# Patient Record
Sex: FEMALE | Race: WHITE | NOT HISPANIC OR LATINO | URBAN - METROPOLITAN AREA
[De-identification: names, ages, dates, MRNs, and addresses within clinical notes are randomized per-mention and may not be internally consistent; named-entity substitution may affect disease eponyms.]

---

## 2022-06-27 ENCOUNTER — INPATIENT (INPATIENT)
Facility: HOSPITAL | Age: 72
LOS: 1 days | Discharge: HOME | DRG: 190 | End: 2022-06-29
Attending: INTERNAL MEDICINE | Admitting: INTERNAL MEDICINE
Payer: MEDICARE

## 2022-06-27 VITALS
SYSTOLIC BLOOD PRESSURE: 120 MMHG | TEMPERATURE: 99 F | RESPIRATION RATE: 20 BRPM | HEART RATE: 103 BPM | OXYGEN SATURATION: 88 % | DIASTOLIC BLOOD PRESSURE: 66 MMHG | HEIGHT: 63 IN | WEIGHT: 235.01 LBS

## 2022-06-27 LAB
ALBUMIN SERPL ELPH-MCNC: 4.3 G/DL — SIGNIFICANT CHANGE UP (ref 3.3–5)
ALP SERPL-CCNC: 82 U/L — SIGNIFICANT CHANGE UP (ref 40–120)
ALT FLD-CCNC: 16 U/L — SIGNIFICANT CHANGE UP (ref 10–45)
ANION GAP SERPL CALC-SCNC: 12 MMOL/L — SIGNIFICANT CHANGE UP (ref 5–17)
APPEARANCE UR: CLEAR — SIGNIFICANT CHANGE UP
APTT BLD: 33.2 SEC — SIGNIFICANT CHANGE UP (ref 27.5–35.5)
AST SERPL-CCNC: 19 U/L — SIGNIFICANT CHANGE UP (ref 10–40)
BACTERIA # UR AUTO: NEGATIVE — SIGNIFICANT CHANGE UP
BASOPHILS # BLD AUTO: 0.04 K/UL — SIGNIFICANT CHANGE UP (ref 0–0.2)
BASOPHILS NFR BLD AUTO: 0.5 % — SIGNIFICANT CHANGE UP (ref 0–2)
BILIRUB SERPL-MCNC: 0.5 MG/DL — SIGNIFICANT CHANGE UP (ref 0.2–1.2)
BILIRUB UR-MCNC: NEGATIVE — SIGNIFICANT CHANGE UP
BUN SERPL-MCNC: 14 MG/DL — SIGNIFICANT CHANGE UP (ref 7–23)
CALCIUM SERPL-MCNC: 9.1 MG/DL — SIGNIFICANT CHANGE UP (ref 8.4–10.5)
CHLORIDE SERPL-SCNC: 99 MMOL/L — SIGNIFICANT CHANGE UP (ref 96–108)
CO2 SERPL-SCNC: 26 MMOL/L — SIGNIFICANT CHANGE UP (ref 22–31)
COLOR SPEC: YELLOW — SIGNIFICANT CHANGE UP
COMMENT - URINE: SIGNIFICANT CHANGE UP
CREAT SERPL-MCNC: 0.81 MG/DL — SIGNIFICANT CHANGE UP (ref 0.5–1.3)
DIFF PNL FLD: NEGATIVE — SIGNIFICANT CHANGE UP
EGFR: 78 ML/MIN/1.73M2 — SIGNIFICANT CHANGE UP
EOSINOPHIL # BLD AUTO: 0 K/UL — SIGNIFICANT CHANGE UP (ref 0–0.5)
EOSINOPHIL NFR BLD AUTO: 0 % — SIGNIFICANT CHANGE UP (ref 0–6)
EPI CELLS # UR: 1 /HPF — SIGNIFICANT CHANGE UP
GAS PNL BLDV: SIGNIFICANT CHANGE UP
GLUCOSE SERPL-MCNC: 132 MG/DL — HIGH (ref 70–99)
GLUCOSE UR QL: NEGATIVE — SIGNIFICANT CHANGE UP
HCT VFR BLD CALC: 43 % — SIGNIFICANT CHANGE UP (ref 34.5–45)
HGB BLD-MCNC: 13.9 G/DL — SIGNIFICANT CHANGE UP (ref 11.5–15.5)
HYALINE CASTS # UR AUTO: 1 /LPF — SIGNIFICANT CHANGE UP (ref 0–2)
IMM GRANULOCYTES NFR BLD AUTO: 0.4 % — SIGNIFICANT CHANGE UP (ref 0–1.5)
INR BLD: 1.12 RATIO — SIGNIFICANT CHANGE UP (ref 0.88–1.16)
KETONES UR-MCNC: ABNORMAL
LEUKOCYTE ESTERASE UR-ACNC: NEGATIVE — SIGNIFICANT CHANGE UP
LYMPHOCYTES # BLD AUTO: 0.44 K/UL — LOW (ref 1–3.3)
LYMPHOCYTES # BLD AUTO: 5.5 % — LOW (ref 13–44)
MCHC RBC-ENTMCNC: 28.5 PG — SIGNIFICANT CHANGE UP (ref 27–34)
MCHC RBC-ENTMCNC: 32.3 GM/DL — SIGNIFICANT CHANGE UP (ref 32–36)
MCV RBC AUTO: 88.3 FL — SIGNIFICANT CHANGE UP (ref 80–100)
MONOCYTES # BLD AUTO: 0.21 K/UL — SIGNIFICANT CHANGE UP (ref 0–0.9)
MONOCYTES NFR BLD AUTO: 2.6 % — SIGNIFICANT CHANGE UP (ref 2–14)
NEUTROPHILS # BLD AUTO: 7.3 K/UL — SIGNIFICANT CHANGE UP (ref 1.8–7.4)
NEUTROPHILS NFR BLD AUTO: 91 % — HIGH (ref 43–77)
NITRITE UR-MCNC: NEGATIVE — SIGNIFICANT CHANGE UP
NRBC # BLD: 0 /100 WBCS — SIGNIFICANT CHANGE UP (ref 0–0)
PH UR: 6 — SIGNIFICANT CHANGE UP (ref 5–8)
PLATELET # BLD AUTO: 235 K/UL — SIGNIFICANT CHANGE UP (ref 150–400)
POTASSIUM SERPL-MCNC: 4.3 MMOL/L — SIGNIFICANT CHANGE UP (ref 3.5–5.3)
POTASSIUM SERPL-SCNC: 4.3 MMOL/L — SIGNIFICANT CHANGE UP (ref 3.5–5.3)
PROT SERPL-MCNC: 7.1 G/DL — SIGNIFICANT CHANGE UP (ref 6–8.3)
PROT UR-MCNC: SIGNIFICANT CHANGE UP
PROTHROM AB SERPL-ACNC: 13 SEC — SIGNIFICANT CHANGE UP (ref 10.5–13.4)
RAPID RVP RESULT: SIGNIFICANT CHANGE UP
RBC # BLD: 4.87 M/UL — SIGNIFICANT CHANGE UP (ref 3.8–5.2)
RBC # FLD: 13.3 % — SIGNIFICANT CHANGE UP (ref 10.3–14.5)
RBC CASTS # UR COMP ASSIST: 3 /HPF — SIGNIFICANT CHANGE UP (ref 0–4)
SARS-COV-2 RNA SPEC QL NAA+PROBE: SIGNIFICANT CHANGE UP
SODIUM SERPL-SCNC: 137 MMOL/L — SIGNIFICANT CHANGE UP (ref 135–145)
SP GR SPEC: 1.02 — SIGNIFICANT CHANGE UP (ref 1.01–1.02)
UROBILINOGEN FLD QL: NEGATIVE — SIGNIFICANT CHANGE UP
WBC # BLD: 8.02 K/UL — SIGNIFICANT CHANGE UP (ref 3.8–10.5)
WBC # FLD AUTO: 8.02 K/UL — SIGNIFICANT CHANGE UP (ref 3.8–10.5)
WBC UR QL: 3 /HPF — SIGNIFICANT CHANGE UP (ref 0–5)

## 2022-06-27 PROCEDURE — 99218: CPT | Mod: FS,CS,25

## 2022-06-27 PROCEDURE — 93010 ELECTROCARDIOGRAM REPORT: CPT

## 2022-06-27 RX ORDER — LOSARTAN POTASSIUM 100 MG/1
100 TABLET, FILM COATED ORAL DAILY
Refills: 0 | Status: DISCONTINUED | OUTPATIENT
Start: 2022-06-27 | End: 2022-06-29

## 2022-06-27 RX ORDER — IPRATROPIUM/ALBUTEROL SULFATE 18-103MCG
3 AEROSOL WITH ADAPTER (GRAM) INHALATION ONCE
Refills: 0 | Status: COMPLETED | OUTPATIENT
Start: 2022-06-27 | End: 2022-06-27

## 2022-06-27 RX ORDER — AZITHROMYCIN 500 MG/1
500 TABLET, FILM COATED ORAL EVERY 24 HOURS
Refills: 0 | Status: DISCONTINUED | OUTPATIENT
Start: 2022-06-27 | End: 2022-06-29

## 2022-06-27 RX ORDER — DIAZEPAM 5 MG
5 TABLET ORAL AT BEDTIME
Refills: 0 | Status: DISCONTINUED | OUTPATIENT
Start: 2022-06-27 | End: 2022-06-29

## 2022-06-27 RX ORDER — ATORVASTATIN CALCIUM 80 MG/1
40 TABLET, FILM COATED ORAL AT BEDTIME
Refills: 0 | Status: DISCONTINUED | OUTPATIENT
Start: 2022-06-27 | End: 2022-06-29

## 2022-06-27 RX ORDER — IPRATROPIUM/ALBUTEROL SULFATE 18-103MCG
3 AEROSOL WITH ADAPTER (GRAM) INHALATION EVERY 6 HOURS
Refills: 0 | Status: DISCONTINUED | OUTPATIENT
Start: 2022-06-27 | End: 2022-06-29

## 2022-06-27 RX ORDER — BUDESONIDE AND FORMOTEROL FUMARATE DIHYDRATE 160; 4.5 UG/1; UG/1
2 AEROSOL RESPIRATORY (INHALATION)
Refills: 0 | Status: DISCONTINUED | OUTPATIENT
Start: 2022-06-27 | End: 2022-06-28

## 2022-06-27 RX ADMIN — ATORVASTATIN CALCIUM 40 MILLIGRAM(S): 80 TABLET, FILM COATED ORAL at 21:20

## 2022-06-27 RX ADMIN — LOSARTAN POTASSIUM 100 MILLIGRAM(S): 100 TABLET, FILM COATED ORAL at 21:19

## 2022-06-27 RX ADMIN — Medication 3 MILLILITER(S): at 15:55

## 2022-06-27 RX ADMIN — AZITHROMYCIN 255 MILLIGRAM(S): 500 TABLET, FILM COATED ORAL at 19:21

## 2022-06-27 RX ADMIN — Medication 3 MILLILITER(S): at 21:22

## 2022-06-27 RX ADMIN — Medication 3 MILLILITER(S): at 13:44

## 2022-06-27 RX ADMIN — Medication 40 MILLIGRAM(S): at 19:21

## 2022-06-27 NOTE — ED CDU PROVIDER DISPOSITION NOTE - CARE PROVIDER_API CALL
Mehdi Betancur)  Critical Care Medicine; Internal Medicine; Pulmonary Disease  [address], [place] [address]  Phone: [phone]  Fax: [phone]  Follow Up Time:

## 2022-06-27 NOTE — ED ADULT NURSE NOTE - OBJECTIVE STATEMENT
72 y/o female with PMHx of HTN, HLD, COPD presents to the ED sent in by urgent care for hypoxia. Patient states that she has been feeling "sick" for three days. Patient complains of sore throat, dry cough and congestion. Today felt generalized weakness and head pressure. She went to urgent care and was told that she was hypoxic and had an abnormal EKG. She was sent to the ED for further evaluation. Patient states that she was given prednisone at urgent care. States that her son had "strep" few weeks ago. Denies any other sick contacts. She does not wear oxygen at home. Denies any headache, fever, chills, chest pain, abdominal pain, n/v/d.

## 2022-06-27 NOTE — ED CDU PROVIDER INITIAL DAY NOTE - ATTENDING APP SHARED VISIT CONTRIBUTION OF CARE
Attending MD Santamaria:   I personally have seen and examined this patient. I have performed a substantive portion of the visit including all aspects of the medical decision making.   Physician assistant note reviewed and agree on plan of care and except where noted.            *The above represents an initial assessment/impression. Please refer to progress notes for potential changes in patient clinical course*

## 2022-06-27 NOTE — ED CDU PROVIDER DISPOSITION NOTE - NSFOLLOWUPINSTRUCTIONS_ED_ALL_ED_FT
Follow up with your Primary Care Physician within the next 2-3 days    Follow up with Pulmonology to get your medications for COPD managed sometime this week    Mehdi Betancur)  Critical Care Medicine; Internal Medicine; Pulmonary Disease  [address], [place] [address]  Phone: [phone]  Fax: [phone]    Bring a copy of your test results with you to your appointment    Continue your current medication regimen.   Additionally, You will need to take Azithromycin as prescribed  Inhalers:  Steroids:       Chronic Obstructive Pulmonary Disease    Chronic obstructive pulmonary disease (COPD) is a lung condition in which airflow from the lungs is limited. Causes include smoking, secondhand smoke exposure, genetics, or recurrent infections. Take all medicines (inhaled or pills) as directed by your health care provider. Avoid exposure to irritants such as smoke, chemicals, and fumes that aggravate your breathing.    If you are a smoker, the most important thing that you can do is stop smoking. Continuing to smoke will cause further lung damage and breathing trouble. Ask your health care provider for help with quitting smoking.    SEEK IMMEDIATE MEDICAL CARE IF YOU HAVE ANY OF THE FOLLOWING SYMPTOMS: shortness of breath at rest or when talking, bluish discoloration of lips, skin, fever, worsening cough, unexplained chest pain, or lightheadedness/dizziness.

## 2022-06-27 NOTE — ED PROVIDER NOTE - PROGRESS NOTE DETAILS
02 sat 89% after ambulation in the ED. Patient reassessed after Duoneb. On reexamination patient with persistent wheezing to b/l lower lung fields. States that she didn't finish the 2nd duoneb because she went to the bathroom. Will order another Duoneb. Melina Phipps PA-C Patient evaluated by CDU PA. Patient states that she does not want to continue taking Duoneb treatments because the albuterol gives her palpitations. Explained to patient that she needs to continue both steroids and Duoneb treatments in order to improve her breathing. Explained to patient that with both admission and the observation unit these medications are essential for her treatment. Patient refusing admission to the hospital. Shared decision making made with patient to be placed in CDU rather than admission to the hospital. Patient in agreement of plan for IV steroids and nebulizer treatments. She prefers to have nebulizer treatments spaced out longer than normal. Call placed out to [facility] pulmonologist Dr. Mehdi Betancur. Melina Phipps PA-C Patient evaluated by CDU PA. Patient states that she does not want to continue taking Duoneb treatments because the albuterol gives her palpitations. Explained to patient that she needs to continue both steroids and Duoneb treatments in order to improve her breathing. Explained to patient that with both admission and the observation unit these medications are essential for her treatment. Patient refusing admission to the hospital. Shared decision making made with patient to be placed in CDU rather than admission to the hospital. Patient in agreement of plan for IV steroids and nebulizer treatments. She prefers to have nebulizer treatments spaced out longer than normal. Call placed to pulmonology. Patients pulmonologist Dr. Mehdi Betancur. Melina Phipps PA-C Spoke to Dr. Reagan who is covering pulmonologist for patients doctor Dr. Beatncur. Discussed case. He agrees with treatment plan. He stated that doctor from their team will evaluate patient in the AM. Melina Phipps PA-C

## 2022-06-27 NOTE — ED ADULT TRIAGE NOTE - CHIEF COMPLAINT QUOTE
worsening sob, cough, sore throat, weakness- [facility]/s- covid swab negative, while at urgent care pt found to have LLL infiltrate on chest xray with a.flutter on ekg which is a new finding, pt denies any previous cardiac hx  (pt had recent close contact with son who has strep throat) strep at urgent care neg

## 2022-06-27 NOTE — ED CDU PROVIDER DISPOSITION NOTE - CLINICAL COURSE
72 y/o female with PMHx of HTN, HLD, COPD presents to the ED sent in by urgent care for hypoxia. Patient states that she has been feeling "sick" for three days. Patient complains of sore throat, dry cough and congestion. patient with O2 90% on RA, patient endorses typically 94%. patient states that she is noncompliant with COPD meds at home. patient with poor air movement, negative xray and negative RVP. patient declining admission for new o2 requirement, agrees to Obs for q8h steroids, duonebs and azithro as well as pulm consult.  overnight_________________ 70 y/o female with PMHx of HTN, HLD, COPD presents to the ED sent in by urgent care for hypoxia. Patient states that she has been feeling "sick" for three days. Patient complains of sore throat, dry cough and congestion. patient with O2 90% on RA, patient endorses typically 94%. patient states that she is noncompliant with COPD meds at home. patient with poor air movement, negative xray and negative RVP. patient declining admission for new o2 requirement, agrees to Obs for q8h steroids, duonebs and azithro as well as pulm consult.  in cdu, pt continued to have wheezing, hypoxia. ambulatory pulse ox- low 80s. pt seen by Pulm. will admit to medicine with their following

## 2022-06-27 NOTE — ED PROVIDER NOTE - ATTENDING APP SHARED VISIT CONTRIBUTION OF CARE
Attending MD Santamaria:   I personally have seen and examined this patient. I have performed a substantive portion of the visit including all aspects of the medical decision making.   Physician assistant note reviewed and agree on plan of care and except where noted.          71F with COPD not on home O2 presenting for evaluation of cough, throat pain and dyspnea, seen at urgent care and reportedly with infiltrate on CXR and abnormal ECG. Patient here on 2L NC, well appearing, no acute distress, BS diminished with scant wheezes in all lung fields. Plan for repeat CXR, bronchodilators, reassess. Patient reportedly received PO prednisone at urgent care. ECG reviewed from urgent care and it is not atrial flutter, erroneous computer read.       *The above represents an initial assessment/impression. Please refer to progress notes for potential changes in patient clinical course*

## 2022-06-27 NOTE — ED PROVIDER NOTE - NS ED ATTENDING STATEMENT MOD
This was a shared visit with the KO. I reviewed and verified the documentation and independently performed the documented:

## 2022-06-27 NOTE — ED CDU PROVIDER DISPOSITION NOTE - ATTENDING APP SHARED VISIT CONTRIBUTION OF CARE
Attending MD Santamaria:   I personally have seen and examined this patient. I have performed a substantive portion of the visit including all aspects of the medical decision making.   Physician assistant note reviewed and agree on plan of care and except where noted.

## 2022-06-27 NOTE — ED PROVIDER NOTE - PHYSICAL EXAMINATION
CONSTITUTIONAL: Patient is awake, alert and oriented x 3. Patient is well appearing and in no acute distress.  HEAD: NCAT  EYES: PERRL bilaterally,   ENT: Airway patent, Nasal mucosa clear  NECK: Supple,   LUNGS: lung sounds diminished throughout;   HEART: RRR.+S1S2   ABDOMEN: Soft, non-tender to palpation throughout all four quadrants,   MSK: No edema or calf tenderness b/l, FROM upper and lower ext b/l,   SKIN: No rash or lesions  NEURO: No focal deficits,

## 2022-06-27 NOTE — ED CDU PROVIDER INITIAL DAY NOTE - OBJECTIVE STATEMENT
70 y/o female with PMHx of HTN, HLD, COPD presents to the ED sent in by urgent care for hypoxia. Patient states that she has been feeling "sick" for three days. Patient complains of sore throat, dry cough and congestion. Today felt generalized weakness and head pressure. She went to urgent care and was told that she was hypoxic and had an abnormal EKG. She was sent to the ED for further evaluation. Patient states that she was given prednisone at urgent care. States that her son had "strep" few weeks ago. Denies any other sick contacts. She does not wear oxygen at home. Denies any headache, fever, chills, chest pain, abdominal pain, n/v/d. patient reports noncompliance with her COPD meds, does not use inhalers at home. quite smoking 12 years ago, smoked 2 packs per day "since she was a child" prior to this. patient reports her o2 is typically 94% on RA.

## 2022-06-27 NOTE — ED ADULT TRIAGE NOTE - WEIGHT IN LBS
Pt resting comfortably, vitally stable, medications administered as ordered, continue to monitor. 835

## 2022-06-27 NOTE — ED PROVIDER NOTE - CARE PLAN
1 Principal Discharge DX:	COPD exacerbation   Principal Discharge DX:	COPD exacerbation  Secondary Diagnosis:	Hypertension  Secondary Diagnosis:	Anxiety  Secondary Diagnosis:	Acute respiratory failure with hypoxia

## 2022-06-27 NOTE — ED PROVIDER NOTE - OBJECTIVE STATEMENT
72 y/o female with PMHx of HTN, HLD, COPD presents to the ED sent in by urgent care for hypoxia. Patient states that she has been feeling "sick" for three days. Patient complains of sore throat, dry cough and congestion. Today felt generalized weakness and head pressure. She went to urgent care and was told that she was hypoxic and had an abnormal EKG. She was sent to the ED for further evaluation. Patient states that she was given prednisone at urgent care. States that her son had "strep" few weeks ago. Denies any other sick contacts. She does not wear o 72 y/o female with PMHx of HTN, HLD, COPD presents to the ED sent in by urgent care for hypoxia. Patient states that she has been feeling "sick" for three days. Patient complains of sore throat, dry cough and congestion. Today felt generalized weakness and head pressure. She went to urgent care and was told that she was hypoxic and had an abnormal EKG. She was sent to the ED for further evaluation. Patient states that she was given prednisone at urgent care. States that her son had "strep" few weeks ago. Denies any other sick contacts. She does not wear oxygen at home. Denies any headache, fever, chills, chest pain, abdominal pain, n/v/d.

## 2022-06-27 NOTE — ED CDU PROVIDER INITIAL DAY NOTE - PROGRESS NOTE DETAILS
per ED patient declined admission, they will contact pulm for am consult plan for steroids q8h, ari, nebs prn. patient states unwilling to do nebs every 6 hours, as she states they make her anxious - Brenna Calderon PA-C CDU PROGRESS NOTE RENZO NOVAK: Received pt at 1900 sign-out. Case/plan reviewed. ED discussed w/ Dr. Reagan who is covering pulmonologist for patients doctor Dr. Betancur. He agrees with treatment plan. He stated that doctor from their team will evaluate patient in the AM. CDU PROGRESS NOTE PA SCARLET: Pt resting comfortably, NAD, VSS. Saturating 95% on 2L NC. Lungs +diminished breath sounds bilaterally. Will continue w/ Steroids and duo nebs, monitor over night on continuos pulse ox.

## 2022-06-27 NOTE — ED ADULT NURSE NOTE - ED STAT RN HANDOFF DETAILS
35-year-old  Woman presented with symptomatic anemia.  She reports history of heavy menstrual periods over the last 2-3 years.  She reported that  vaginal bleeding has been heavier recently and has been ongoing for 3-4 weeks.  States that her periods started around 3-4 weeks and has bleeding since then.  States that bleeding fluctuates.  Reports heavy bleeding with clots on some days and lighter bleeding on the other days.  States that she is currently going through 1-3 pads per day.  She has been experiencing fatigue and shortness of breath with ambulation over the last week as well as lightheadedness but denies any loss of consciousness or falls.  No history of  blood thinners or bleeding disorders.  She had plans to see OB/GYN as referred by PCP she had appt for early next month.This admission  hemoglobin was found to be 4.3.  She was given a total of 4 units of PRBCs with Hgb at 8.9. Patient's symptoms improving. She remained hemodynamically stable. OB/GYN evaluated patient and transvaginal US revealed large submucous fibroid and thickened endometrial lining. Per OB/GYN given these findings not a candidate for ablation. Patient given a dose of depoprovera to decrease risk of further bleeding. Patient will follow up with OB/GYN as outpatient.     #Symptomatic anemia secondary to acute blood loss secondary to fibroids  -Follow up with OB/GYN as outpatient.  -Follow up with pcp in 1-2 weeks  -Iron supplementation prescribed.   report to  DORIAN Martinez

## 2022-06-28 DIAGNOSIS — J44.1 CHRONIC OBSTRUCTIVE PULMONARY DISEASE WITH (ACUTE) EXACERBATION: ICD-10-CM

## 2022-06-28 LAB — GAS PNL BLDV: SIGNIFICANT CHANGE UP

## 2022-06-28 PROCEDURE — G1004: CPT

## 2022-06-28 PROCEDURE — 71275 CT ANGIOGRAPHY CHEST: CPT | Mod: 26,MG

## 2022-06-28 PROCEDURE — 99217: CPT | Mod: CS

## 2022-06-28 RX ORDER — IBUPROFEN 200 MG
600 TABLET ORAL ONCE
Refills: 0 | Status: COMPLETED | OUTPATIENT
Start: 2022-06-28 | End: 2022-06-28

## 2022-06-28 RX ORDER — BENZOCAINE AND MENTHOL 5; 1 G/100ML; G/100ML
1 LIQUID ORAL EVERY 4 HOURS
Refills: 0 | Status: DISCONTINUED | OUTPATIENT
Start: 2022-06-28 | End: 2022-06-29

## 2022-06-28 RX ORDER — BENZOCAINE AND MENTHOL 5; 1 G/100ML; G/100ML
1 LIQUID ORAL
Refills: 0 | Status: DISCONTINUED | OUTPATIENT
Start: 2022-06-28 | End: 2022-06-28

## 2022-06-28 RX ORDER — BUDESONIDE, MICRONIZED 100 %
0.5 POWDER (GRAM) MISCELLANEOUS EVERY 12 HOURS
Refills: 0 | Status: DISCONTINUED | OUTPATIENT
Start: 2022-06-28 | End: 2022-06-29

## 2022-06-28 RX ORDER — IPRATROPIUM/ALBUTEROL SULFATE 18-103MCG
3 AEROSOL WITH ADAPTER (GRAM) INHALATION ONCE
Refills: 0 | Status: COMPLETED | OUTPATIENT
Start: 2022-06-28 | End: 2022-06-28

## 2022-06-28 RX ADMIN — Medication 20 MILLIGRAM(S): at 12:02

## 2022-06-28 RX ADMIN — BENZOCAINE AND MENTHOL 1 LOZENGE: 5; 1 LIQUID ORAL at 12:03

## 2022-06-28 RX ADMIN — Medication 3 MILLILITER(S): at 23:32

## 2022-06-28 RX ADMIN — AZITHROMYCIN 255 MILLIGRAM(S): 500 TABLET, FILM COATED ORAL at 19:57

## 2022-06-28 RX ADMIN — ATORVASTATIN CALCIUM 40 MILLIGRAM(S): 80 TABLET, FILM COATED ORAL at 21:58

## 2022-06-28 RX ADMIN — Medication 600 MILLIGRAM(S): at 12:02

## 2022-06-28 RX ADMIN — Medication 20 MILLIGRAM(S): at 18:15

## 2022-06-28 RX ADMIN — Medication 40 MILLIGRAM(S): at 03:50

## 2022-06-28 RX ADMIN — Medication 3 MILLILITER(S): at 03:50

## 2022-06-28 RX ADMIN — Medication 0.5 MILLIGRAM(S): at 12:03

## 2022-06-28 RX ADMIN — Medication 3 MILLILITER(S): at 10:14

## 2022-06-28 NOTE — CONSULT NOTE ADULT - CONSULT REASON
hypoxemia; chronic hypercapnic respiratory failure; COPD/asthma with exacerbation; obesity hypoxemia; chronic hypercapnic respiratory failure; COPD/asthma with exacerbation; morbid obesity; restrictive lung disease; pharyngitis

## 2022-06-28 NOTE — H&P ADULT - ASSESSMENT
70 y/o female with PMHx of HTN, HLD, COPD presents to the ED sent in by urgent care for hypoxia.     # Acute Hypoxic respiratory Failure   Oxygen Solumedrol Nebs   Pulm consulted    Follow up Por BNP     # COPD exacerbation- Plan as above     # HTN Continue with Telmisartan     # HLD Atorvastatin     Son Bed side discussed plan of care at length

## 2022-06-28 NOTE — ED CDU PROVIDER SUBSEQUENT DAY NOTE - HISTORY
CDU PROGRESS NOTE PA SCARLET: Pt resting comfortably, NAD, VSS. Pulse ox 97% on 2L NC. Lungs CTAB. Will continue to monitor, steroids and reassess.

## 2022-06-28 NOTE — PATIENT PROFILE ADULT - FUNCTIONAL ASSESSMENT - BASIC MOBILITY 6.
4-calculated by average/Not able to assess (calculate score using [facility] averaging method)

## 2022-06-28 NOTE — CONSULT NOTE ADULT - ASSESSMENT
ASSESSMENT:    71 year old gentlewoman, former smoker, followed by Dr. Mehdi Betancur of our practice for COPD/asthma syndrome, pulmonary hypertension and an abnormal chest CT -> hyperaeration with moderate centrilobular and minimal paraseptal emphysema - stable mucus plugging and atelectasis in the lingula - nodularity in the anterior left lower lobe favored to represent confluent vascular structures - hepatic steatosis. Most recent spirometry -> moderate restrictive lung disease with superimposed mild - moderate obstruction. She patient is currently on no pulmonary medications. She developed hoarseness on a long acting inhaled steroid (Arnuity Ellipta. She stopped Spiriva and Xopenex MDI as needed on her own. The patient was last seen in our office in November. She missed her appointment in May due to exposure to COVID. She has chronic shortness of breath with exertion. She sleeps slightly upright in bed. She has no PND, orthopnea or lower extremity swelling. An ECHO in 2020 had preserved LVEF and no significant valvular heart disease. The patient developed a sore throat several days ago. She has since developed worsening of her shortness of breath. She has a cough productive of scant sputum. She has chest congestion and wheeze. She has no fevers, chills or sweats. No chest pain/pressure or palpitations. She was seen in an urgent care center yesterday and sent to the ER due to hypoxemia.    dyspnea/hypoxemia -> multifactorial - VBG -> 7.33/60/42/32/66.7 suggestive of acute on chronic hypercapnic respiratory failure  1) moderate restrictive lung disease in the setting of morbid obesity limiting diaphragmatic excursion and chest wall excursion  2) viral induced COPD/asthma exacerbation  3) deconditioning    PLAN/RECOMMENDATIONS:    stable but borderline oxygenation on room air at rest (89 - 91%)  check oxygen saturation on room air with ambulation - if hypoxic will need admission  check VBG for reevaluation of acute on chronic hypercapnic respiratory failure  bedside spirometry  azithromycin  solumedrol 20mg IVP q6h  albuterol/atrovent nebs q6h  pulmicort 0.5mg nebs q12h - give after duoneb - rinse mouth after use  to be discharged on a trelegy ellipta 200/62.5/25mcg/inhlation - 1 inhalation daily  cardiac meds: losartan/lipitor  throat lozenges/ibuprofen for pharyngitis  need to check prior cardiac evaluation - there is a report of an ECHO from 2020 from a hospitalization at [place] for pneumonia    Thank you for the courtesy of this referral. Plan of care discussed with the patient at bedside and with the ER staff    Radu Lopez MD, [facility]  [phone]  Pulmonary Medicine     ASSESSMENT:    71 year old gentlewoman, former smoker, followed by Dr. Mehdi Betancur of our practice for COPD/asthma syndrome, pulmonary hypertension and an abnormal chest CT -> hyperaeration with moderate centrilobular and minimal paraseptal emphysema - stable mucus plugging and atelectasis in the lingula - nodularity in the anterior left lower lobe favored to represent confluent vascular structures - hepatic steatosis. Most recent spirometry -> moderate restrictive lung disease with superimposed mild - moderate obstruction. She patient is currently on no pulmonary medications. She developed hoarseness on a long acting inhaled steroid (Arnuity Ellipta. She stopped Spiriva and Xopenex MDI as needed on her own. The patient was last seen in our office in November. She missed her appointment in May due to exposure to COVID. She has chronic shortness of breath with exertion. She sleeps slightly upright in bed. She has no PND, orthopnea or lower extremity swelling. An ECHO in 2020 had preserved LVEF and no significant valvular heart disease. The patient developed a sore throat several days ago. She has since developed worsening of her shortness of breath. She has a cough productive of scant sputum. She has chest congestion and wheeze. She has no fevers, chills or sweats. No chest pain/pressure or palpitations. She was seen in an urgent care center yesterday and sent to the ER due to hypoxemia.    dyspnea/hypoxemia -> multifactorial - VBG -> 7.33/60/42/32/66.7 suggestive of acute on chronic hypercapnic respiratory failure  1) moderate restrictive lung disease in the setting of morbid obesity limiting diaphragmatic excursion and chest wall excursion  2) viral induced COPD/asthma exacerbation  3) deconditioning    PLAN/RECOMMENDATIONS:    stable but borderline oxygenation on room air at rest (89 - 91%)  check oxygen saturation on room air with ambulation - if hypoxic will need admission and CTA chest  check VBG for reevaluation of acute on chronic hypercapnic respiratory failure  bedside spirometry  azithromycin  solumedrol 20mg IVP q6h  albuterol/atrovent nebs q6h  pulmicort 0.5mg nebs q12h - give after duoneb - rinse mouth after use  to be discharged on a trelegy ellipta 200/62.5/25mcg/inhlation - 1 inhalation daily  cardiac meds: losartan/lipitor  throat lozenges/ibuprofen for pharyngitis  need to check prior cardiac evaluation - there is a report of an ECHO from 2020 from a hospitalization at [place] for pneumonia    Thank you for the courtesy of this referral. Plan of care discussed with the patient at bedside and with the ER staff    Radu Lopez MD, [facility]  [phone]  Pulmonary Medicine

## 2022-06-28 NOTE — ED CDU PROVIDER SUBSEQUENT DAY NOTE - MEDICAL DECISION MAKING DETAILS
I have personally performed a face to face diagnostic evaluation on this patient.  I have reviewed the ACP note and agree with the history, exam, and plan of care, except as noted.  History and Exam by me shows       71F with COPD observed overnight for COPD exacerbation and hypoxic resp failure. Patient reports improvement this AM in chest congestion, still with throat pain. Examination reveals diffuse scattered wheezes and rhonchi but improved aeration, no increased work of breathing. O2 trend off oxygen 87%-90%. Will continue on supplemental O2 for now, continue bronchodilators, IV steroids. Patient is pending pulmonology evaluation this morning. Will follow recommendations closely.

## 2022-06-28 NOTE — H&P ADULT - HISTORY OF PRESENT ILLNESS
70 y/o female with pmhx htn, hld, COPD presents to the ED sent in by urgent care for hypoxia.     Patient states that she has been feeling "sick" for three days- sore throat myalgias, cough with congestion.   She went to urgent care and was told that she was hypoxic and had an abnormal EKG. She was sent to the ED for further evaluation.   Patient states that she was given prednisone at urgent care.  No sick contacts     CTA done in the ed shows no PE

## 2022-06-28 NOTE — ED ADULT NURSE REASSESSMENT NOTE - NS ED NURSE REASSESS COMMENT FT1
@1900 Pt received from DORIAN Martin. Pt oriented to CDU & plan of care was discussed. Pt A&O x 4. Pt admitted awaiting for bed. Pt denies any respiratory distress, chest pain, SOB, dizziness or palpitations as of now. Pt o continuous pulse ox sating 95-97% on RA. speaks in clear sentences, airway intact, pt has +wheezing b/l. V/S stable, pt afebrile,  IV in place, patent and free of signs of infiltration. Pt resting in bed. Safety & comfort measures maintained. Call bell in reach. Will continue to monitor.    @2025 Report given to DORIAN Holman. AO4. VSS as per flowsheet. Pt denies pain. Family at bedside. Safety maintained.
Pt received from DORIAN Briceno. Pt oriented to CDU & plan of care was discussed. Pt A&O x 4. Pt in CDU for nebs q6h, steroids q8h, supplemental o2, continuous pulse ox, pulm consult, azithro q24 hr. Pt denies any chest pain, SOB, dizziness, palpitations, respiratory distress as of now. Lungs +diminished breath sounds bilaterally. V/S stable, pt afebrile, IV in place, patent and free of signs of infiltration. Pt resting in bed. Safety & comfort measures maintained. Call bell in reach. Will continue to monitor.
07.00 Am Received the Pt from  DORIAN Mabry. . Pt is Observed for SOB. Received the Pt A&OX 4 obeys commands Silvia N/V/D fever chills cp SOB   Comfort care & safety measures continued  IV site looks clean & dry no signs of infiltration noted pt denies  pain IV site .  Pt is advised to call for help  call bell with in the reach pt verbalized the understanding .  pending CDU  MD williamson . GCS 15/15 A&OX 4 PERRLA  size 3 Strong upper & lower extremities steady gait   No facial droop  No Hand Leg drop denies numbness tingling Continue to monitor. pt states she feels better  speaks in clear sentences no respiratory distress noted +  rhonchi  continue to monitor

## 2022-06-28 NOTE — ED CDU PROVIDER SUBSEQUENT DAY NOTE - PROGRESS NOTE DETAILS
CDU NOTE RENZO Carlson: pt resting, still with cough, sob, wheezing. no new complaints. NAD. VS- oxygenating at 90-91% on RA.   continue duonebs, monitoring. pulm to see patient today CDU NOTE RENZO Carlson: pt evaluated by Dr. Lopez, recommended ambulatory pulse ox be done. pt at 81-83% on RA with ambulation. pt admitted to medicine with pulm follow

## 2022-06-28 NOTE — CONSULT NOTE ADULT - SUBJECTIVE AND OBJECTIVE BOX
[facility] - [facility] - CONSULT NOTE    HPI: 71 year old gentlewoman, former smoker, followed by Dr. Mehdi Betancur of our practice for COPD/asthma syndrome, pulmonary hypertension and an abnormal chest CT -> hyperaeration with moderate centrilobular and minimal paraseptal emphysema - stable mucus plugging and atelectasis in the lingula - nodularity in the anterior left lower lobe favored to represent confluent vascular structures - hepatic steatosis. Most recent spirometry -> moderate restrictive lung disease with superimposed mild - moderate obstruction. She is maintained on a xopenex MDI as needed. Spiriva was discontinued due to hoarseness She has chronic shortness of breath with exertion. The patient also has a history of HTN and HLD. The patient was seen in an urgent care center yesterday and sent to the ER due to hypoxemia    PMHX:  COPD/asthma overlap syndrome  Pulmonary hypertension  Abnormal chest CT  HTN (hypertension)  HLD (hyperlipidemia)    PSHX:  Tonsillectomy   Section  Hysterectomy  Foot surgery - left  Finger surgery - index finger - right    FAMILY HISTORY:  mother - COPD/emphysema - HTN - DM -CAD/MI/CHF    SOCIAL HISTORY:  former smoker discontinue in ;     Pulmonary Medications:   albuterol/ipratropium for Nebulization 3 milliLiter(s) Nebulizer every 6 hours  budesonide 160 MICROgram(s)/formoterol 4.5 MICROgram(s) Inhaler 2 Puff(s) Inhalation two times a day      Antimicrobials:  azithromycin  IVPB 500 milliGRAM(s) IV Intermittent every 24 hours      Cardiology:  losartan 100 milliGRAM(s) Oral daily      Other:  atorvastatin 40 milliGRAM(s) Oral at bedtime  methylPREDNISolone sodium succinate Injectable 40 milliGRAM(s) IV Push every 8 hours      Prn:  MEDICATIONS  (PRN):  diazepam    Tablet 5 milliGRAM(s) Oral at bedtime PRN anxiety      Allergies    epinephrine    HOME MEDICATIONS: see  H & P    REVIEW OF SYSTEMS:  Constitutional: As per HPI  HEENT: Within normal limits  CV: As per HPI  Resp: As per HPI  GI: Within normal limits   : Within normal limits  Musculoskeletal: Within normal limits  Skin: Within normal limits  Neurological: Within normal limits  Psychiatric: Within normal limits  Endocrine: Within normal limits  Hematologic/Lymphatic: Within normal limits  Allergic/Immunologic: Within normal limits    [x] All other systems negative    OBJECTIVE:    Daily Height in cm: 160.02 (2022 11:35)      PHYSICAL EXAM:  ICU Vital Signs Last 24 Hrs  T(C): 36.6 (2022 07:45), Max: 37.1 (2022 11:35)  T(F): 97.9 (2022 07:45), Max: 98.7 (2022 11:35)  HR: 77 (2022 07:45) (76 - 103)  BP: 141/72 (2022 07:45) (110/67 - 173/74)  BP(mean): --  ABP: --  ABP(mean): --  RR: 18 (2022 07:45) (18 - 22)  SpO2: 96% (2022 07:45) (88% - 99%)    General: Awake. Alert. Cooperative. No distress. Appears stated age 	  HEENT:   Atraumatic. Normocephalic. Anicteric. Normal oral mucosa. PERRL. EOMI.  Neck: Supple. Trachea midline. Thyroid without enlargement/tenderness/nodules. No carotid bruit. No JVD.	  Cardiovascular: Regular rate and rhythm. S1 S2 normal. No murmurs, rubs or gallops.  Respiratory: Respirations unlabored. Clear to auscultation and percussion bilaterally. No curvature.  Abdomen: Soft. Non-tender. Non-distended. No organomegaly. No masses. Normal bowel sounds.  Extremities: Warm to touch. No clubbing or cyanosis. No pedal edema.  Pulses: 2+ peripheral pulses all extremities.	  Skin: Normal skin color. No rashes or lesions. No ecchymoses. No cyanosis. Warm to touch.  Lymph Nodes: Cervical, supraclavicular and axillary nodes normal  Neurological: Motor and sensory examination equal and normal. A and O x 3  Psychiatry: Appropriate mood and affect.      LABS:                          13.9   8.02  )-----------( 235      ( 2022 13:55 )             43.0     CBC    WBC  8.02 <==    Hemoglobin  13.9 <<==    Hematocrit  43.0 <==    Platelets  235 <==      137  |  99  |  14  ----------------------------<  132<H>    -27  4.3   |  26  |  0.81    LYTES    sodium  137 <==    potassium   4.3 <==    chloride  99 <==    carbon dioxide  26 <==    =============================================================================================  RENAL FUNCTION:    Creatinine:   0.81  <<==    BUN:   14 <==    ============================================================================================    calcium   9.1 <==    ============================================================================================  LFTs    AST:   19 <==     ALT:  16  <==     AP:  82  <=    Bili:  0.5  <=    PT/INR - ( 2022 13:55 )   PT: 13.0 sec;   INR: 1.12 ratio       PTT - ( 2022 13:55 )  PTT:33.2 sec    Venous Blood Gas:   @ 13:40  7.33/60/42/32/66.7  VBG Lactate: 1.3    Procalcitonin, Serum: 0.09 ng/mL ( @ 13:55)    MICROBIOLOGY:     Respiratory Viral Panel with COVID-19 by [facility] (22 @ 13:55)   Rapid RVP Result: Not[facility]   SARS-CoV-2: NotDetec   This Respiratory Panel uses polymerase chain reaction (PCR) to detect for   adenovirus; coronavirus (HKU1, NL63, 229E, OC43); human metapneumovirus   (hMPV); human enterovirus/rhinovirus (Entero/RV); influenza A; influenza   A/H1; influenza A/H3; influenza A/H1-2009; influenza B; parainfluenza   viruses 1, 2, 3, 4; respiratory syncytial virus; Mycoplasma pneumoniae;   Chlamydophila pneumoniae; and SARS-CoV-2.     Urinalysis Basic - ( 2022 16:04 )    Color: Yellow / Appearance: Clear / S.016 / pH: x  Gluc: x / Ketone: Small  / Bili: Negative / Urobili: Negative   Blood: x / Protein: Trace / Nitrite: Negative   Leuk Esterase: Negative / RBC: 3 /hpf / WBC 3 /HPF   Sq Epi: x / Non Sq Epi: 1 /hpf / Bacteria: Negative      RADIOLOGY:  [x ] Chest radiographs reviewed and interpreted by me    EXAM:  XR CHEST PORTABLE URGENT 1V                          PROCEDURE DATE:  2022      FINDINGS:  The lungs are clear.  There is no pleural effusion or pneumothorax.  The heart size is not well evaluated on this projection.  The visualized osseous and soft tissue structures demonstrate no acute   pathology.    IMPRESSION:  Clear lungs.    LIBERTY MADDEN MD; Resident Radiology  This document has been electronically signed.  SANCHEZ CARDOSO MD; Attending Radiologist  This document has been electronically signed. 2022  2:36PM  ---------------------------------------------------------------------------------------------------------------       [facility] - [facility] - CONSULT NOTE    HPI: 71 year old gentlewoman, former smoker, followed by Dr. Mehdi Betancur of our practice for COPD/asthma syndrome, pulmonary hypertension and an abnormal chest CT -> hyperaeration with moderate centrilobular and minimal paraseptal emphysema - stable mucus plugging and atelectasis in the lingula - nodularity in the anterior left lower lobe favored to represent confluent vascular structures - hepatic steatosis. Most recent spirometry -> moderate restrictive lung disease with superimposed mild - moderate obstruction. She patient is currently on no pulmonary medications. She developed hoarseness on a long acting inhaled steroid (Arnuity Ellipta. She stopped Spiriva and Xopenex MDI as needed on her own. The patient was last seen in our office in November. She missed her appointment in May due to exposure to COVID. She has chronic shortness of breath with exertion. She sleeps slightly upright in bed. She has no PND, orthopnea or lower extremity swelling. An ECHO in  had preserved LVEF and no significant valvular heart disease. The patient developed a sore throat several days ago. She has since developed worsening of her shortness of breath. She has a cough productive of scant sputum. She has chest congestion and wheeze. She has no fevers, chills or sweats. No chest pain/pressure or palpitations. She was seen in an urgent care center yesterday and sent to the ER due to hypoxemia. Asked to evaluate    PMHX:  COPD/asthma overlap syndrome  Pulmonary hypertension  Abnormal chest CT  HTN (hypertension)  HLD (hyperlipidemia)    PSHX:  Tonsillectomy   Section  Hysterectomy  Foot surgery - left  Finger surgery - index finger - right    FAMILY HISTORY:  mother - COPD/emphysema - HTN - DM -CAD/MI/CHF    SOCIAL HISTORY:  former smoker discontinue in ;  - lives with her ; retired from [employer]    Pulmonary Medications:   albuterol/ipratropium for Nebulization 3 milliLiter(s) Nebulizer every 6 hours  budesonide 160 MICROgram(s)/formoterol 4.5 MICROgram(s) Inhaler 2 Puff(s) Inhalation two times a day      Antimicrobials:  azithromycin  IVPB 500 milliGRAM(s) IV Intermittent every 24 hours      Cardiology:  losartan 100 milliGRAM(s) Oral daily      Other:  atorvastatin 40 milliGRAM(s) Oral at bedtime  methylPREDNISolone sodium succinate Injectable 40 milliGRAM(s) IV Push every 8 hours      Prn:  MEDICATIONS  (PRN):  diazepam    Tablet 5 milliGRAM(s) Oral at bedtime PRN anxiety      Allergies    epinephrine    HOME MEDICATIONS: see  H & P    REVIEW OF SYSTEMS:  Constitutional: As per HPI  HEENT: pharyngitis  CV: As per HPI  Resp: As per HPI  GI: Within normal limits   : Within normal limits  Musculoskeletal: Within normal limits  Skin: Within normal limits  Neurological: Within normal limits  Psychiatric: Within normal limits  Endocrine: Within normal limits  Hematologic/Lymphatic: Within normal limits  Allergic/Immunologic: Within normal limits    [x] All other systems negative    OBJECTIVE:    Daily Height in cm: 160.02 (2022 11:35)      PHYSICAL EXAM:  ICU Vital Signs Last 24 Hrs  T(C): 36.6 (2022 07:45), Max: 37.1 (2022 11:35)  T(F): 97.9 (2022 07:45), Max: 98.7 (2022 11:35)  HR: 77 (2022 07:45) (76 - 103)  BP: 141/72 (2022 07:45) (110/67 - 173/74)  BP(mean): --  ABP: --  ABP(mean): --  RR: 18 (2022 07:45) (18 - 22)  SpO2: 96% (2022 07:45) (88% on room air at rest)    General: Awake. Alert. Cooperative. No distress. Appears stated age. Obese. 	  HEENT: Atraumatic. Normocephalic. Anicteric. Normal oral mucosa. PERRL. EOMI. Mallampati class III airway.  Neck: Supple. Trachea midline. Thyroid without enlargement/tenderness/nodules. No carotid bruit. No JVD. Short and wide.  Cardiovascular: Regular rate and rhythm. S1 S2 normal. No murmurs, rubs or gallops.  Respiratory: Respirations unlabored. Decreased breath sounds throughout. Diffuse mild wheeze. No curvature.  Abdomen: Soft. Non-tender. Non-distended. No organomegaly. No masses. Normal bowel sounds. Central obesity.  Extremities: Warm to touch. No clubbing or cyanosis. No pedal edema.  Pulses: 2+ peripheral pulses all extremities.	  Skin: Normal skin color. No rashes or lesions. No ecchymoses. No cyanosis. Warm to touch.  Lymph Nodes: Cervical, supraclavicular and axillary nodes normal  Neurological: Motor and sensory examination equal and normal. A and O x 3  Psychiatry: Appropriate mood and affect.      LABS:                          13.9   8.02  )-----------( 235      ( 2022 13:55 )             43.0     CBC    WBC  8.02 <==    Hemoglobin  13.9 <<==    Hematocrit  43.0 <==    Platelets  235 <==      137  |  99  |  14  ----------------------------<  132<H>    06-27  4.3   |  26  |  0.81    LYTES    sodium  137 <==    potassium   4.3 <==    chloride  99 <==    carbon dioxide  26 <==    =============================================================================================  RENAL FUNCTION:    Creatinine:   0.81  <<==    BUN:   14 <==    ============================================================================================    calcium   9.1 <==    ============================================================================================  LFTs    AST:   19 <==     ALT:  16  <==     AP:  82  <=    Bili:  0.5  <=    PT/INR - ( 2022 13:55 )   PT: 13.0 sec;   INR: 1.12 ratio       PTT - ( 2022 13:55 )  PTT:33.2 sec    Venous Blood Gas:   @ 13:40  7.33/60/42/32/66.7  VBG Lactate: 1.3    Procalcitonin, Serum: 0.09 ng/mL ( @ 13:55)    MICROBIOLOGY:     Respiratory Viral Panel with COVID-19 by [facility] (22 @ 13:55)   [facility]   [facility]   This Respiratory Panel uses polymerase chain reaction (PCR) to detect for   adenovirus; coronavirus (HKU1, NL63, 229E, OC43); human metapneumovirus   (hMPV); human enterovirus/rhinovirus (Entero/RV); influenza A; influenza   A/H1; influenza A/H3; influenza A/H1-2009; influenza B; parainfluenza   viruses 1, 2, 3, 4; respiratory syncytial virus; Mycoplasma pneumoniae;   Chlamydophila pneumoniae; and SARS-CoV-2.     Urinalysis Basic - ( 2022 16:04 )    Color: Yellow / Appearance: Clear / S.016 / pH: x  Gluc: x / Ketone: Small  / Bili: Negative / Urobili: Negative   Blood: x / Protein: Trace / Nitrite: Negative   Leuk Esterase: Negative / RBC: 3 /hpf / WBC 3 /HPF   Sq Epi: x / Non Sq Epi: 1 /hpf / Bacteria: Negative      RADIOLOGY:  [x ] Chest radiographs reviewed and interpreted by me    EXAM:  XR CHEST PORTABLE URGENT 1V                          PROCEDURE DATE:  2022      FINDINGS:  The lungs are clear.  There is no pleural effusion or pneumothorax.  The heart size is not well evaluated on this projection.  The visualized osseous and soft tissue structures demonstrate no acute   pathology.    IMPRESSION:  Clear lungs.    LIBERTY MADDEN MD; Resident Radiology  This document has been electronically signed.  SANCHEZ CARDOSO MD; Attending Radiologist  This document has been electronically signed. 2022  2:36PM  ---------------------------------------------------------------------------------------------------------------

## 2022-06-29 ENCOUNTER — TRANSCRIPTION ENCOUNTER (OUTPATIENT)
Age: 72
End: 2022-06-29

## 2022-06-29 VITALS
SYSTOLIC BLOOD PRESSURE: 140 MMHG | HEART RATE: 70 BPM | TEMPERATURE: 98 F | RESPIRATION RATE: 18 BRPM | DIASTOLIC BLOOD PRESSURE: 63 MMHG | OXYGEN SATURATION: 92 %

## 2022-06-29 LAB
HCV AB S/CO SERPL IA: 0.15 S/CO — SIGNIFICANT CHANGE UP (ref 0–0.99)
HCV AB SERPL-IMP: SIGNIFICANT CHANGE UP

## 2022-06-29 RX ORDER — DIAZEPAM 5 MG
1 TABLET ORAL
Qty: 0 | Refills: 0 | DISCHARGE
Start: 2022-06-29

## 2022-06-29 RX ORDER — IBUPROFEN 200 MG
600 TABLET ORAL ONCE
Refills: 0 | Status: COMPLETED | OUTPATIENT
Start: 2022-06-29 | End: 2022-06-29

## 2022-06-29 RX ORDER — LOSARTAN POTASSIUM 100 MG/1
1 TABLET, FILM COATED ORAL
Qty: 0 | Refills: 0 | DISCHARGE
Start: 2022-06-29

## 2022-06-29 RX ORDER — IBUPROFEN 200 MG
600 TABLET ORAL EVERY 6 HOURS
Refills: 0 | Status: DISCONTINUED | OUTPATIENT
Start: 2022-06-29 | End: 2022-06-29

## 2022-06-29 RX ORDER — AZITHROMYCIN 500 MG/1
500 TABLET, FILM COATED ORAL DAILY
Refills: 0 | Status: DISCONTINUED | OUTPATIENT
Start: 2022-06-29 | End: 2022-06-29

## 2022-06-29 RX ORDER — ATORVASTATIN CALCIUM 80 MG/1
1 TABLET, FILM COATED ORAL
Qty: 0 | Refills: 0 | DISCHARGE
Start: 2022-06-29

## 2022-06-29 RX ORDER — FLUTICASONE FUROATE, UMECLIDINIUM BROMIDE AND VILANTEROL TRIFENATATE 200; 62.5; 25 UG/1; UG/1; UG/1
1 POWDER RESPIRATORY (INHALATION)
Qty: 1 | Refills: 0
Start: 2022-06-29 | End: 2022-07-28

## 2022-06-29 RX ORDER — AZITHROMYCIN 500 MG/1
1 TABLET, FILM COATED ORAL
Qty: 5 | Refills: 0
Start: 2022-06-29 | End: 2022-07-03

## 2022-06-29 RX ADMIN — Medication 20 MILLIGRAM(S): at 06:04

## 2022-06-29 RX ADMIN — Medication 50 MILLIGRAM(S): at 11:57

## 2022-06-29 RX ADMIN — Medication 600 MILLIGRAM(S): at 08:42

## 2022-06-29 RX ADMIN — LOSARTAN POTASSIUM 100 MILLIGRAM(S): 100 TABLET, FILM COATED ORAL at 06:04

## 2022-06-29 RX ADMIN — Medication 3 MILLILITER(S): at 11:53

## 2022-06-29 RX ADMIN — Medication 20 MILLIGRAM(S): at 00:12

## 2022-06-29 RX ADMIN — AZITHROMYCIN 500 MILLIGRAM(S): 500 TABLET, FILM COATED ORAL at 11:53

## 2022-06-29 RX ADMIN — BENZOCAINE AND MENTHOL 1 LOZENGE: 5; 1 LIQUID ORAL at 02:19

## 2022-06-29 RX ADMIN — Medication 3 MILLILITER(S): at 06:04

## 2022-06-29 RX ADMIN — Medication 0.5 MILLIGRAM(S): at 06:04

## 2022-06-29 RX ADMIN — BENZOCAINE AND MENTHOL 1 LOZENGE: 5; 1 LIQUID ORAL at 06:10

## 2022-06-29 NOTE — PROGRESS NOTE ADULT - SUBJECTIVE AND OBJECTIVE BOX
[facility] - PROGRESS NOTE    CHIEF COMPLAINT: dyspnea; hypoxemia; chronic hypercapnic respiratory failure; severe COPD/asthma with exacerbation; morbid obesity; restrictive lung disease; pharyngitis     INTERVAL HISTORY: short of breath and hypoxic walking slowly in the [place]; no shortness of breath or hypoxemia at rest; occasional cough productive of scant sputum; mild chest congestion ane wheeze; no fevers, chills or sweats; no chest pain/pressure or palpitations;     REVIEW OF SYSTEMS:  Constitutional: As per interval history  HEENT: Within normal limits  CV: As per interval history  Resp: As per interval history  GI: Within normal limits   : Within normal limits  Musculoskeletal: Within normal limits  Skin: Within normal limits  Neurological: Within normal limits  Psychiatric: Within normal limits  Endocrine: Within normal limits  Hematologic/Lymphatic: Within normal limits  Allergic/Immunologic: Within normal limits    MEDICATIONS:     Pulmonary "  albuterol/ipratropium for Nebulization 3 milliLiter(s) Nebulizer every 6 hours  buDESOnide    Inhalation Suspension 0.5 milliGRAM(s) Inhalation every 12 hours    Anti-microbials:  azithromycin  IVPB 500 milliGRAM(s) IV Intermittent every 24 hours    Cardiovascular:  losartan 100 milliGRAM(s) Oral daily    Other:  atorvastatin 40 milliGRAM(s) Oral at bedtime  methylPREDNISolone sodium succinate Injectable 20 milliGRAM(s) IV Push every 6 hours    MEDICATIONS  (PRN):  benzocaine 15 mG/menthol 3.6 mG Lozenge 1 Lozenge Oral every 4 hours PRN Sore Throat  diazepam    Tablet 5 milliGRAM(s) Oral at bedtime PRN anxiety    OBJECTIVE:    Daily Height in cm: 160.02 (2022 10:16)      PHYSICAL EXAM:       ICU Vital Signs Last 24 Hrs  T(C): 36.6 (2022 06:02), Max: 36.9 (2022 12:34)  T(F): 97.9 (2022 06:02), Max: 98.5 (2022 12:34)  HR: 67 (2022 06:02) (63 - 97)  BP: 137/63 (2022 06:02) (131/66 - 158/76)  BP(mean): 99 (2022 10:16) (99 - 99)  ABP: --  ABP(mean): --  RR: 18 (2022 06:02) (18 - 19)  SpO2: 94% (2022 06:02) (93% - 98%) on room air at rest     General: Awake. Alert. Cooperative. No distress. Appears stated age. Obese. 	  HEENT: Atraumatic. Normocephalic. Anicteric. Normal oral mucosa. PERRL. EOMI. Mallampati class III airway.  Neck: Supple. Trachea midline. Thyroid without enlargement/tenderness/nodules. No carotid bruit. No JVD. Short and wide.  Cardiovascular: Regular rate and rhythm. S1 S2 normal. No murmurs, rubs or gallops.  Respiratory: Respirations unlabored. Decreased breath sounds throughout. Diffuse mild wheeze. No curvature.  Abdomen: Soft. Non-tender. Non-distended. No organomegaly. No masses. Normal bowel sounds. Central obesity.  Extremities: Warm to touch. No clubbing or cyanosis. No pedal edema.  Pulses: 2+ peripheral pulses all extremities.	  Skin: Normal skin color. No rashes or lesions. No ecchymoses. No cyanosis. Warm to touch.  Lymph Nodes: Cervical, supraclavicular and axillary nodes normal  Neurological: Motor and sensory examination equal and normal. A and O x 3  Psychiatry: Appropriate mood and affect.    LABS:                          13.9   8.02  )-----------( 235      ( 2022 13:55 )             43.0     CBC    WBC  8.02 <==    Hemoglobin  13.9 <<==    Hematocrit  43.0 <==    Platelets  235 <==      137  |  99  |  14  ----------------------------<  132<H>    06-27  4.3   |  26  |  0.81      LYTES    sodium  137 <==    potassium   4.3 <==    chloride  99 <==    carbon dioxide  26 <==    =============================================================================================  RENAL FUNCTION:    Creatinine:   0.81  <<==    BUN:   14 <==    ============================================================================================    calcium   9.1 <==    ============================================================================================  LFTs    AST:   19 <==     ALT:  16  <==     AP:  82  <=    Bili:  0.5  <=    PT/INR - ( 2022 13:55 )   PT: 13.0 sec;   INR: 1.12 ratio       PTT - ( 2022 13:55 )  PTT:33.2 sec    Venous Blood Gas:   @ 15:40  7.43/46/50/30/84.0  VBG Lactate: 1.8    Venous Blood Gas:   @ 13:40  7.33/60/42/32/66.7  VBG Lactate: 1.3    Procalcitonin, Serum: 0.09 ng/mL ( @ 13:55)      MICROBIOLOGY:     Respiratory Viral Panel with COVID-19 by [facility] (22 @ 13:55)   [facility]   [facility]   This Respiratory Panel uses polymerase chain reaction (PCR) to detect for   adenovirus; coronavirus (HKU1, NL63, 229E, OC43); human metapneumovirus   (hMPV); human enterovirus/rhinovirus (Entero/RV); influenza A; influenza   A/H1; influenza A/H3; influenza A/H1-2009; influenza B; parainfluenza   viruses 1, 2, 3, 4; respiratory syncytial virus; Mycoplasma pneumoniae;   Chlamydophila pneumoniae; and SARS-CoV-2.     Urinalysis Basic - ( 2022 16:04 )    Color: Yellow / Appearance: Clear / S.016 / pH: x  Gluc: x / Ketone: Small  / Bili: Negative / Urobili: Negative   Blood: x / Protein: Trace / Nitrite: Negative   Leuk Esterase: Negative / RBC: 3 /hpf / WBC 3 /HPF   Sq Epi: x / Non Sq Epi: 1 /hpf / Bacteria: Negative      RADIOLOGY:  [x] Chest radiographs reviewed and interpreted by me    [facility]                          PROCEDURE DATE:  2022      INTERPRETATION:  Clinical information: Dyspnea. Evaluate for pulmonary   embolus.    CT angiogram of the chest was obtained following administration of   intravenous contrast. Approximately 80 cc of Omnipaque 350 was   administered. Coronal, sagittal and MIP images were submitted for review.    No hilar and or mediastinal adenopathy is noted.    Heart is enlarged in size. No pericardial effusion is noted. Pulmonary   arteries are normal in caliber. No filling defects are noted.    Marked narrowing of the trachea at the thoracic inlet is noted. Narrowing   of the bronchus intermedius is also noted. Mucous/secretions are noted   within several of the bronchi in both lower lobes. Linear opacities   representing atelectasis are noted in both lower lobes. No pleural   effusions are noted.    Below the diaphragm, visualized portions of the abdomen demonstrate   low-attenuation lesion in the left kidney which is too small to be   adequately characterized on this exam.    Degenerative changes of the spine are noted.    IMPRESSION: No pulmonary embolus is noted.    Marked narrowing of the trachea and the bronchus intermedius as described   above. Etiology is unclear.    BARAK SEN MD; Attending Radiologist  This document has been electronically signed. 2022  2:06PM  ---------------------------------------------------------------------------------------------------------------  EXAM:  XR CHEST PORTABLE URGENT 1V                          PROCEDURE DATE:  2022      FINDINGS:  The lungs are clear.  There is no pleural effusion or pneumothorax.  The heart size is not well evaluated on this projection.  The visualized osseous and soft tissue structures demonstrate no acute   pathology.    IMPRESSION:  Clear lungs.    LIBERTY MADDEN MD; Resident Radiology  This document has been electronically signed.  SANCHEZ CARDOSO MD; Attending Radiologist  This document has been electronically signed. 2022  2:36PM  ---------------------------------------------------------------------------------------------------------------        
Patient is a 71y old  Female who presents with a chief complaint of Dyspnea x 3 days (29 Jun 2022 17:33)      SUBJECTIVE / OVERNIGHT EVENTS: no events     MEDICATIONS  (STANDING):  albuterol/ipratropium for Nebulization 3 milliLiter(s) Nebulizer every 6 hours  atorvastatin 40 milliGRAM(s) Oral at bedtime  azithromycin   Tablet 500 milliGRAM(s) Oral daily  buDESOnide    Inhalation Suspension 0.5 milliGRAM(s) Inhalation every 12 hours  losartan 100 milliGRAM(s) Oral daily  predniSONE   Tablet   Oral   predniSONE   Tablet 50 milliGRAM(s) Oral daily    MEDICATIONS  (PRN):  benzocaine 15 mG/menthol 3.6 mG Lozenge 1 Lozenge Oral every 4 hours PRN Sore Throat  diazepam    Tablet 5 milliGRAM(s) Oral at bedtime PRN anxiety  ibuprofen  Tablet. 600 milliGRAM(s) Oral every 6 hours PRN Mild Pain (1 - 3), Moderate Pain (4 - 6), Severe Pain (7 - 10)      CAPILLARY BLOOD GLUCOSE        I&O's Summary    29 Jun 2022 07:01  -  30 Jun 2022 01:16  --------------------------------------------------------  IN: 240 mL / OUT: 0 mL / NET: 240 mL      T(C): 36.7 (06-29-22 @ 17:55), Max: 36.7 (06-29-22 @ 17:55)  HR: 70 (06-29-22 @ 17:55) (70 - 70)  BP: 140/63 (06-29-22 @ 17:55) (140/63 - 140/63)  RR: 18 (06-29-22 @ 17:55) (18 - 18)  SpO2: 92% (06-29-22 @ 17:55) (92% - 92%)    PHYSICAL EXAM:  GENERAL: NAD, well-developed  HEAD:  Atraumatic, Normocephalic  EYES: EOMI, PERRLA, conjunctiva and sclera clear  NECK: Supple, No JVD  CHEST/LUNG: Clear to auscultation bilaterally; No wheeze  HEART: Regular rate and rhythm; No murmurs, rubs, or gallops  ABDOMEN: Soft, Nontender, Nondistended; Bowel sounds present  EXTREMITIES:  2+ Peripheral Pulses, No clubbing, cyanosis, or edema  PSYCH: AAOx3  NEUROLOGY: non-focal  SKIN: No rashes or lesions        no new labs                     RADIOLOGY & ADDITIONAL TESTS:    Imaging Personally Reviewed:    Consultant(s) Notes Reviewed:      Care Discussed with Consultants/Other Providers:

## 2022-06-29 NOTE — DISCHARGE NOTE PROVIDER - NSDCMRMEDTOKEN_GEN_ALL_CORE_FT
atorvastatin 40 mg oral tablet: 1 tab(s) orally once a day (at bedtime)  diazePAM 5 mg oral tablet: 1 tab(s) orally once a day (at bedtime), As needed, anxiety  losartan 100 mg oral tablet: 1 tab(s) orally once a day  predniSONE 10 mg oral tablet: 5 tabs for 3 days  4 tabs for 3 days   2 tabs for 3 days  1 tab for 3 days then d/c  Trelegy Ellipta 200 mcg-62.5 mcg-25 mcg/inh inhalation powder: 1 puff(s) inhaled once a day   Zithromax 500 mg oral tablet: 1 tab(s) orally once a day

## 2022-06-29 NOTE — PROGRESS NOTE ADULT - ASSESSMENT
ASSESSMENT:    71 year old gentlewoman, former smoker, followed by Dr. Mehdi Betancur of our practice for COPD/asthma syndrome, pulmonary hypertension and an abnormal chest CT -> hyperaeration with moderate centrilobular and minimal paraseptal emphysema - stable mucus plugging and atelectasis in the lingula - nodularity in the anterior left lower lobe favored to represent confluent vascular structures - hepatic steatosis. Most recent spirometry -> moderate restrictive lung disease with superimposed mild - moderate obstruction. She patient is currently on no pulmonary medications. She developed hoarseness on a long acting inhaled steroid (Arnuity Ellipta. She stopped Spiriva and Xopenex MDI as needed on her own. The patient was last seen in our office in November. She missed her appointment in May due to exposure to COVID. She has chronic shortness of breath with exertion. She sleeps slightly upright in bed. She has no PND, orthopnea or lower extremity swelling. An ECHO in 2020 had preserved LVEF and no significant valvular heart disease. The patient developed a sore throat several days ago. She has since developed worsening of her shortness of breath. She has a cough productive of scant sputum. She has chest congestion and wheeze. She has no fevers, chills or sweats. No chest pain/pressure or palpitations. She was seen in an urgent care center yesterday and sent to the ER due to hypoxemia.    dyspnea/hypoxemia -> multifactorial - VBG -> 7.33/60/42/32/66.7 suggestive of acute on chronic hypercapnic respiratory failure  1) restrictive lung disease in the setting of morbid obesity limiting diaphragmatic excursion and chest wall excursion  2) viral induced COPD/asthma exacerbation -   3) deconditioning    PLAN/RECOMMENDATIONS:    stable but borderline oxygenation on room air at rest (89 - 91%)  oxygen saturation on room air with ambulation reportedly ~ 85% (not documented in sunrise)  will recheck room air saturation with ambulation to evaluate the need for home oxygen  VBG 6/27 -> 7.33/60/42/32/66.7 - acute on chronic hypercapnic respiratory failure  VBG 6/28 -> 7.43/46/50/30/84.0 - no evidence of chronic hypercapnic respiratory failure  spirometry     FEV1 - 0.76 liters - 33% predicted     FVC - 2.11 liters - 70% predicted     FEV1% - 36       c/w very severe obstructive lung disease  azithromycin  transition solumedrol to prednisone -> taper as written  continue albuterol/atrovent nebs q6h until discharge  continue pulmicort 0.5mg nebs q12h until discharge  discharge on on a trelegy ellipta 200/62.5/25mcg/inhlation - 1 inhalation daily  cardiac meds: losartan/lipitor  throat lozenges/ibuprofen for pharyngitis  outpatient cardiology evaluation to see if there is an additional cardiac component to the patient's dyspnea    Will follow with you. Plan of care discussed with the patient at bedside and with Dr. Aparicio. No pulmonary objection to discharge with home oxygen if needed. She will follow-up with Dr. Mehdi Betancur in our office as planned    Radu Lopez MD, [facility]  [phone]  Pulmonary Medicine       ASSESSMENT:    71 year old gentlewoman, former smoker, followed by Dr. Mehdi Betnacur of our practice for COPD/asthma syndrome, pulmonary hypertension and an abnormal chest CT -> hyperaeration with moderate centrilobular and minimal paraseptal emphysema - stable mucus plugging and atelectasis in the lingula - nodularity in the anterior left lower lobe favored to represent confluent vascular structures - hepatic steatosis. Most recent spirometry -> moderate restrictive lung disease with superimposed mild - moderate obstruction. She patient is currently on no pulmonary medications. She developed hoarseness on a long acting inhaled steroid (Arnuity Ellipta. She stopped Spiriva and Xopenex MDI as needed on her own. The patient was last seen in our office in November. She missed her appointment in May due to exposure to COVID. She has chronic shortness of breath with exertion. She sleeps slightly upright in bed. She has no PND, orthopnea or lower extremity swelling. An ECHO in 2020 had preserved LVEF and no significant valvular heart disease. The patient developed a sore throat several days ago. She has since developed worsening of her shortness of breath. She has a cough productive of scant sputum. She has chest congestion and wheeze. She has no fevers, chills or sweats. No chest pain/pressure or palpitations. She was seen in an urgent care center yesterday and sent to the ER due to hypoxemia.    dyspnea/hypoxemia -> multifactorial - VBG -> 7.33/60/42/32/66.7 suggestive of acute on chronic hypercapnic respiratory failure  1) restrictive lung disease in the setting of morbid obesity limiting diaphragmatic excursion and chest wall excursion  2) viral induced COPD/asthma exacerbation -   3) deconditioning    PLAN/RECOMMENDATIONS:    stable but borderline oxygenation on room air at rest (89 - 91%)  oxygen saturation on room air with ambulation reportedly ~ 85% (not documented in sunrise)  will recheck room air saturation with ambulation to evaluate the need for home oxygen  VBG 6/27 -> 7.33/60/42/32/66.7 - acute on chronic hypercapnic respiratory failure  VBG 6/28 -> 7.43/46/50/30/84.0 - no evidence of chronic hypercapnic respiratory failure  spirometry     FEV1 - 0.76 liters - 33% predicted     FVC - 2.11 liters - 70% predicted     FEV1% - 36       c/w very severe obstructive lung disease  CTA -> heart is enlarged in size - no pulmonary emboli - marked narrowing of the trachea at the thoracic inlet is noted - narrowing of the bronchus intermedius is also noted - mucous/secretions are noted within several of the bronchi in both lower lobes with atelectasis  azithromycin  transition solumedrol to prednisone -> taper as written  continue albuterol/atrovent nebs q6h until discharge  continue pulmicort 0.5mg nebs q12h until discharge  discharge on on a trelegy ellipta 200/62.5/25mcg/inhlation - 1 inhalation daily  cardiac meds: losartan/lipitor  throat lozenges/ibuprofen for pharyngitis  outpatient cardiology evaluation to see if there is an additional cardiac component to the patient's dyspnea  follow-up CT in 3 months - may need bronchoscopy to evaluate the airway anatomy ? tracheobronchomalacia ? endobronchial tumor ? mucous    Will follow with you. Plan of care discussed with the patient at bedside and with Dr. Aparicio. No pulmonary objection to discharge with home oxygen if needed. She will follow-up with Dr. Mehdi Betancur in our office as planned    Radu Lopez MD, [facility]  [phone]  Pulmonary Medicine       ASSESSMENT:    71 year old gentlewoman, former smoker, followed by Dr. Mehdi Betancur of our practice for COPD/asthma syndrome, pulmonary hypertension and an abnormal chest CT -> hyperaeration with moderate centrilobular and minimal paraseptal emphysema - stable mucus plugging and atelectasis in the lingula - nodularity in the anterior left lower lobe favored to represent confluent vascular structures - hepatic steatosis. Most recent spirometry -> moderate restrictive lung disease with superimposed mild - moderate obstruction. She patient is currently on no pulmonary medications. She developed hoarseness on a long acting inhaled steroid (Arnuity Ellipta. She stopped Spiriva and Xopenex MDI as needed on her own. The patient was last seen in our office in November. She missed her appointment in May due to exposure to COVID. She has chronic shortness of breath with exertion. She sleeps slightly upright in bed. She has no PND, orthopnea or lower extremity swelling. An ECHO in 2020 had preserved LVEF and no significant valvular heart disease. The patient developed a sore throat several days ago. She has since developed worsening of her shortness of breath. She has a cough productive of scant sputum. She has chest congestion and wheeze. She has no fevers, chills or sweats. No chest pain/pressure or palpitations. She was seen in an urgent care center yesterday and sent to the ER due to hypoxemia.    dyspnea/hypoxemia -> multifactorial - VBG -> 7.33/60/42/32/66.7 suggestive of acute on chronic hypercapnic respiratory failure  1) restrictive lung disease in the setting of morbid obesity limiting diaphragmatic excursion and chest wall excursion  2) viral induced COPD/asthma exacerbation -   3) deconditioning    PLAN/RECOMMENDATIONS:    stable but borderline oxygenation on room air at rest (89 - 91%)  oxygen saturation on room air with ambulation reportedly ~ 85% (not documented in sunrise)  will recheck room air saturation with ambulation to evaluate the need for home oxygen -> 88% - no indication for home oxygen at this point  VBG 6/27 -> 7.33/60/42/32/66.7 - acute on chronic hypercapnic respiratory failure  VBG 6/28 -> 7.43/46/50/30/84.0 - no evidence of chronic hypercapnic respiratory failure  spirometry     FEV1 - 0.76 liters - 33% predicted     FVC - 2.11 liters - 70% predicted     FEV1% - 36       c/w very severe obstructive lung disease  CTA -> heart is enlarged in size - no pulmonary emboli - marked narrowing of the trachea at the thoracic inlet is noted - narrowing of the bronchus intermedius is also noted - mucous/secretions are noted within several of the bronchi in both lower lobes with atelectasis  azithromycin  transition solumedrol to prednisone -> taper as written  continue albuterol/atrovent nebs q6h until discharge  continue pulmicort 0.5mg nebs q12h until discharge  discharge on on a trelegy ellipta 200/62.5/25mcg/inhlation - 1 inhalation daily  cardiac meds: losartan/lipitor  throat lozenges/ibuprofen for pharyngitis  outpatient cardiology evaluation to see if there is an additional cardiac component to the patient's dyspnea  follow-up CT in 3 months - may need bronchoscopy to evaluate the airway anatomy ? tracheobronchomalacia ? endobronchial tumor ? mucous    Will follow with you. Plan of care discussed with the patient at bedside and with Dr. Aparicio. No pulmonary objection to discharge with home oxygen if needed. She will follow-up with Dr. Mehdi Betancur in our office as planned    Radu Lopez MD, [facility]  [phone]  Pulmonary Medicine

## 2022-06-29 NOTE — DISCHARGE NOTE PROVIDER - NSDCCPCAREPLAN_GEN_ALL_CORE_FT
PRINCIPAL DISCHARGE DIAGNOSIS  Diagnosis: COPD exacerbation  Assessment and Plan of Treatment: ct scan show -> heart is enlarged in size - no pulmonary emboli - marked narrowing of the trachea at the thoracic inlet is noted - narrowing of the bronchus intermedius is also noted - mucous/secretions are noted within several of the bronchi in both lower lobes with atelectasis. PLEASE have a follow up ct scan of chest in 3 months; please follow up with Dr Serrato  Call your Health Care provider upon arrival home to make a follow up appointment within one week.  Take all inhalers as prescribed by your Health Care Provider.  Take steroids as prescribed by your Health Care Provider.  If your cough increases infrequency and severity and/or you have shortness of breath or increased shortness of breath call your Health Care Provider.  If you develop fever, chills, night sweats, malaise, and/or change in mental status call your Health care Provider.  Nutrition is very important.  Eat small frequent meals.  Increase your activity as tolerated.  Do not stay in bed all day        SECONDARY DISCHARGE DIAGNOSES  Diagnosis: Hypertension  Assessment and Plan of Treatment: c/w robert , Follow up with your medical doctor to establish long term blood pressure treatment goals.      Diagnosis: Anxiety  Assessment and Plan of Treatment: c/w valium and follow up with PCP

## 2022-06-29 NOTE — DISCHARGE NOTE PROVIDER - CARE PROVIDER_API CALL
Mehdi Betancur)  Critical Care Medicine; Internal Medicine; Pulmonary Disease  [address], [place] [address]  Phone: [phone]  Fax: [phone]  Follow Up Time: 1-3 days

## 2022-06-29 NOTE — CHART NOTE - NSCHARTNOTEFT_GEN_A_CORE
Pt. admitted for copd exacerbation , it was noted she became hypoxic on ambulation . Patient refused home oxygen and stated she will see her private pulmonologist outpatient. Reviewed case with Dr James and patient is cleared for discharge .

## 2022-06-29 NOTE — DISCHARGE NOTE NURSING/CASE MANAGEMENT/SOCIAL WORK - PATIENT PORTAL LINK FT
You can access the FollowMyHealth Patient Portal offered by [facility] by registering at the following website: [email or web address]. By joining [employer]’s FollowMyHealth portal, you will also be able to view your health information using other applications (apps) compatible with our system.

## 2022-06-29 NOTE — PROGRESS NOTE ADULT - ASSESSMENT
72 y/o female with PMHx of HTN, HLD, COPD presents to the ED sent in by urgent care for hypoxia.     # Acute Hypoxic respiratory Failure   Oxygen Solumedrol Nebs   Change  po Prednisone   Pulm consulted    Follow up Por BNP     # COPD exacerbation- Plan as above     # HTN Continue with Telmisartan     # HLD Atorvastatin     Son Bed side discussed plan of care at length

## 2022-06-29 NOTE — DISCHARGE NOTE PROVIDER - NSDCFUADDAPPT_GEN_ALL_CORE_FT
APPTS ARE READY TO BE MADE: [ x] YES    Best Family or Patient Contact (if needed):    Additional Information about above appointments (if needed):    1:   2:   3:     Other comments or requests:    APPTS ARE READY TO BE MADE: [ x] YES    Best Family or Patient Contact (if needed):    Additional Information about above appointments (if needed):    1:   2:   3:     Other comments or requests:     3 attempts were made to reach patient, which have been unsuccessful. 3 Voicemails have been left 6/30, 7/1, and 7/2. Will await a call back from patient to coordinate follow up  care with Dr. Betancur.

## 2022-06-29 NOTE — DISCHARGE NOTE NURSING/CASE MANAGEMENT/SOCIAL WORK - NSDCPEFALRISK_GEN_ALL_CORE
For information on Fall & Injury Prevention, visit: [email or web address] OR  [email or web address] OR  https://www.cdc.gov/steadi/patient.html

## 2022-06-30 LAB
ANION GAP SERPL CALC-SCNC: 11 MMOL/L — SIGNIFICANT CHANGE UP (ref 5–17)
BUN SERPL-MCNC: 34 MG/DL — HIGH (ref 7–23)
CALCIUM SERPL-MCNC: 9.6 MG/DL — SIGNIFICANT CHANGE UP (ref 8.4–10.5)
CHLORIDE SERPL-SCNC: 95 MMOL/L — LOW (ref 96–108)
CO2 SERPL-SCNC: 29 MMOL/L — SIGNIFICANT CHANGE UP (ref 22–31)
CREAT SERPL-MCNC: 1.45 MG/DL — HIGH (ref 0.5–1.3)
EGFR: 39 ML/MIN/1.73M2 — LOW
GLUCOSE SERPL-MCNC: 133 MG/DL — HIGH (ref 70–99)
HCT VFR BLD CALC: 31.8 % — LOW (ref 34.5–45)
HGB BLD-MCNC: 10.1 G/DL — LOW (ref 11.5–15.5)
MCHC RBC-ENTMCNC: 28.3 PG — SIGNIFICANT CHANGE UP (ref 27–34)
MCHC RBC-ENTMCNC: 31.8 GM/DL — LOW (ref 32–36)
MCV RBC AUTO: 89.1 FL — SIGNIFICANT CHANGE UP (ref 80–100)
NRBC # BLD: 0 /100 WBCS — SIGNIFICANT CHANGE UP (ref 0–0)
PLATELET # BLD AUTO: 261 K/UL — SIGNIFICANT CHANGE UP (ref 150–400)
POTASSIUM SERPL-MCNC: 4 MMOL/L — SIGNIFICANT CHANGE UP (ref 3.5–5.3)
POTASSIUM SERPL-SCNC: 4 MMOL/L — SIGNIFICANT CHANGE UP (ref 3.5–5.3)
RBC # BLD: 3.57 M/UL — LOW (ref 3.8–5.2)
RBC # FLD: 15.9 % — HIGH (ref 10.3–14.5)
SODIUM SERPL-SCNC: 135 MMOL/L — SIGNIFICANT CHANGE UP (ref 135–145)
WBC # BLD: 12.61 K/UL — HIGH (ref 3.8–10.5)
WBC # FLD AUTO: 12.61 K/UL — HIGH (ref 3.8–10.5)

## 2022-06-30 PROCEDURE — 94010 BREATHING CAPACITY TEST: CPT

## 2022-06-30 PROCEDURE — 85610 PROTHROMBIN TIME: CPT

## 2022-06-30 PROCEDURE — 86803 HEPATITIS C AB TEST: CPT

## 2022-06-30 PROCEDURE — 82565 ASSAY OF CREATININE: CPT

## 2022-06-30 PROCEDURE — 80053 COMPREHEN METABOLIC PANEL: CPT

## 2022-06-30 PROCEDURE — 94640 AIRWAY INHALATION TREATMENT: CPT

## 2022-06-30 PROCEDURE — 71045 X-RAY EXAM CHEST 1 VIEW: CPT

## 2022-06-30 PROCEDURE — 84145 PROCALCITONIN (PCT): CPT

## 2022-06-30 PROCEDURE — 85014 HEMATOCRIT: CPT

## 2022-06-30 PROCEDURE — 82435 ASSAY OF BLOOD CHLORIDE: CPT

## 2022-06-30 PROCEDURE — 84132 ASSAY OF SERUM POTASSIUM: CPT

## 2022-06-30 PROCEDURE — 85730 THROMBOPLASTIN TIME PARTIAL: CPT

## 2022-06-30 PROCEDURE — 96374 THER/PROPH/DIAG INJ IV PUSH: CPT

## 2022-06-30 PROCEDURE — 81001 URINALYSIS AUTO W/SCOPE: CPT

## 2022-06-30 PROCEDURE — 99285 EMERGENCY DEPT VISIT HI MDM: CPT | Mod: 25

## 2022-06-30 PROCEDURE — 83605 ASSAY OF LACTIC ACID: CPT

## 2022-06-30 PROCEDURE — 71275 CT ANGIOGRAPHY CHEST: CPT | Mod: MG

## 2022-06-30 PROCEDURE — 0225U NFCT DS DNA&RNA 21 SARSCOV2: CPT

## 2022-06-30 PROCEDURE — 82947 ASSAY GLUCOSE BLOOD QUANT: CPT

## 2022-06-30 PROCEDURE — 82803 BLOOD GASES ANY COMBINATION: CPT

## 2022-06-30 PROCEDURE — 85025 COMPLETE CBC W/AUTO DIFF WBC: CPT

## 2022-06-30 PROCEDURE — 85027 COMPLETE CBC AUTOMATED: CPT

## 2022-06-30 PROCEDURE — 84295 ASSAY OF SERUM SODIUM: CPT

## 2022-06-30 PROCEDURE — G1004: CPT

## 2022-06-30 PROCEDURE — 80048 BASIC METABOLIC PNL TOTAL CA: CPT

## 2022-06-30 PROCEDURE — 82330 ASSAY OF CALCIUM: CPT

## 2022-06-30 PROCEDURE — 85018 HEMOGLOBIN: CPT

## 2022-12-08 ENCOUNTER — RESULT REVIEW (OUTPATIENT)
Age: 72
End: 2022-12-08

## 2024-02-29 PROBLEM — I10 ESSENTIAL (PRIMARY) HYPERTENSION: Chronic | Status: ACTIVE | Noted: 2022-06-27

## 2024-02-29 PROBLEM — J44.9 CHRONIC OBSTRUCTIVE PULMONARY DISEASE, UNSPECIFIED: Chronic | Status: ACTIVE | Noted: 2022-06-27

## 2024-03-04 ENCOUNTER — APPOINTMENT (OUTPATIENT)
Dept: VASCULAR SURGERY | Facility: CLINIC | Age: 74
End: 2024-03-04

## 2024-03-15 ENCOUNTER — APPOINTMENT (OUTPATIENT)
Dept: VASCULAR SURGERY | Facility: CLINIC | Age: 74
End: 2024-03-15

## 2024-03-20 ENCOUNTER — APPOINTMENT (OUTPATIENT)
Dept: ORTHOPEDIC SURGERY | Facility: CLINIC | Age: 74
End: 2024-03-20
Payer: MEDICARE

## 2024-03-20 ENCOUNTER — APPOINTMENT (OUTPATIENT)
Dept: MRI IMAGING | Facility: CLINIC | Age: 74
End: 2024-03-20
Payer: MEDICARE

## 2024-03-20 VITALS — HEIGHT: 64 IN | BODY MASS INDEX: 39.27 KG/M2 | WEIGHT: 230 LBS

## 2024-03-20 DIAGNOSIS — I10 ESSENTIAL (PRIMARY) HYPERTENSION: ICD-10-CM

## 2024-03-20 DIAGNOSIS — M23.91 UNSPECIFIED INTERNAL DERANGEMENT OF RIGHT KNEE: ICD-10-CM

## 2024-03-20 DIAGNOSIS — Z86.39 PERSONAL HISTORY OF OTHER ENDOCRINE, NUTRITIONAL AND METABOLIC DISEASE: ICD-10-CM

## 2024-03-20 PROCEDURE — 73590 X-RAY EXAM OF LOWER LEG: CPT | Mod: RT

## 2024-03-20 PROCEDURE — 73564 X-RAY EXAM KNEE 4 OR MORE: CPT | Mod: RT

## 2024-03-20 PROCEDURE — 99203 OFFICE O/P NEW LOW 30 MIN: CPT

## 2024-03-20 PROCEDURE — 73721 MRI JNT OF LWR EXTRE W/O DYE: CPT | Mod: RT,MH

## 2024-03-20 RX ORDER — DIAZEPAM 5 MG/1
5 TABLET ORAL
Refills: 0 | Status: ACTIVE | COMMUNITY

## 2024-03-20 RX ORDER — ATORVASTATIN CALCIUM 10 MG/1
10 TABLET, FILM COATED ORAL
Refills: 0 | Status: ACTIVE | COMMUNITY

## 2024-03-20 RX ORDER — TELMISARTAN AND AMLODIPINE 40; 5 MG/1; MG/1
40-5 TABLET ORAL
Refills: 0 | Status: ACTIVE | COMMUNITY

## 2024-03-20 NOTE — IMAGING
[All Views] : anteroposterior, lateral, skyline, and anteroposterior standing [de-identified] : No effusion, no warmth, no ecchymosis Medial joint line  and posterior tenderness to palpation  Range of motion 0-100 posterior pain with flexion 5/5 quadriceps and hamstring strength Ligamentously stable Motor and sensory intact distally Mild antalgic gait Negative Fede [Right] : right tibia/fibula [There are no fractures, subluxations or dislocations. No significant abnormalities are seen] : There are no fractures, subluxations or dislocations. No significant abnormalities are seen

## 2024-03-20 NOTE — HISTORY OF PRESENT ILLNESS
[5] : 5 [4] : 4 [Dull/Aching] : dull/aching [de-identified] : 3/20/24: Here for right knee/calf pain x 2 weeks. Had doppler done and reports it was negative. No known trauma.  [] : no [FreeTextEntry5] : pain for 2 weeks went to [facility]-doppler negative  [FreeTextEntry1] : R knee/calf

## 2024-03-27 ENCOUNTER — TRANSCRIPTION ENCOUNTER (OUTPATIENT)
Age: 74
End: 2024-03-27

## 2024-03-27 ENCOUNTER — APPOINTMENT (OUTPATIENT)
Dept: ORTHOPEDIC SURGERY | Facility: CLINIC | Age: 74
End: 2024-03-27
Payer: MEDICARE

## 2024-03-27 VITALS — WEIGHT: 230 LBS | HEIGHT: 64 IN | BODY MASS INDEX: 39.27 KG/M2

## 2024-03-27 DIAGNOSIS — M17.11 UNILATERAL PRIMARY OSTEOARTHRITIS, RIGHT KNEE: ICD-10-CM

## 2024-03-27 DIAGNOSIS — E66.01 MORBID (SEVERE) OBESITY DUE TO EXCESS CALORIES: ICD-10-CM

## 2024-03-27 PROCEDURE — 99213 OFFICE O/P EST LOW 20 MIN: CPT

## 2024-03-27 NOTE — ASSESSMENT
[FreeTextEntry1] : Reviewed MRI in detail.  No Baker's cyst or anything significant posteriorly.  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  Explained MRI findings and its significance.  Treatment options outlined.  Discussed course of physical therapy and possible injection.  She wants to hold off.  Weight loss would greatly benefit slowing down the progression of the disease.  She will contact me if any new issues arise as she is feeling better and wants to hold off any treatments